# Patient Record
Sex: MALE | Race: WHITE | URBAN - METROPOLITAN AREA
[De-identification: names, ages, dates, MRNs, and addresses within clinical notes are randomized per-mention and may not be internally consistent; named-entity substitution may affect disease eponyms.]

---

## 2017-06-28 ENCOUNTER — TELEPHONE (OUTPATIENT)
Dept: ORTHOPEDIC SURGERY | Age: 57
End: 2017-06-28

## 2017-07-07 ENCOUNTER — TELEPHONE (OUTPATIENT)
Dept: ORTHOPEDIC SURGERY | Age: 57
End: 2017-07-07

## 2017-07-12 ENCOUNTER — OFFICE VISIT (OUTPATIENT)
Dept: ORTHOPEDIC SURGERY | Age: 57
End: 2017-07-12

## 2017-07-12 VITALS — HEIGHT: 70 IN | WEIGHT: 220 LBS | BODY MASS INDEX: 31.5 KG/M2

## 2017-07-12 DIAGNOSIS — S92.415A CLOSED NONDISPLACED FRACTURE OF PROXIMAL PHALANX OF LEFT GREAT TOE, INITIAL ENCOUNTER: ICD-10-CM

## 2017-07-12 DIAGNOSIS — S92.335A CLOSED NONDISPLACED FRACTURE OF THIRD METATARSAL BONE OF LEFT FOOT, INITIAL ENCOUNTER: ICD-10-CM

## 2017-07-12 DIAGNOSIS — S92.512B OPEN DISPLACED FRACTURE OF PROXIMAL PHALANX OF LESSER TOE OF LEFT FOOT, INITIAL ENCOUNTER: ICD-10-CM

## 2017-07-12 DIAGNOSIS — S92.902B: Primary | ICD-10-CM

## 2017-07-12 PROCEDURE — 99024 POSTOP FOLLOW-UP VISIT: CPT | Performed by: NURSE PRACTITIONER

## 2017-07-13 ENCOUNTER — TELEPHONE (OUTPATIENT)
Dept: ORTHOPEDIC SURGERY | Age: 57
End: 2017-07-13

## 2017-07-13 RX ORDER — CEPHALEXIN 500 MG/1
500 CAPSULE ORAL 4 TIMES DAILY
Qty: 40 CAPSULE | Refills: 0 | Status: SHIPPED | OUTPATIENT
Start: 2017-07-13 | End: 2021-08-11

## 2017-07-21 ENCOUNTER — OFFICE VISIT (OUTPATIENT)
Dept: ORTHOPEDIC SURGERY | Age: 57
End: 2017-07-21

## 2017-07-21 VITALS — HEIGHT: 70 IN | HEART RATE: 72 BPM | BODY MASS INDEX: 31.5 KG/M2 | WEIGHT: 220 LBS | RESPIRATION RATE: 16 BRPM

## 2017-07-21 DIAGNOSIS — S92.512B OPEN DISPLACED FRACTURE OF PROXIMAL PHALANX OF LESSER TOE OF LEFT FOOT, INITIAL ENCOUNTER: Primary | ICD-10-CM

## 2017-07-21 DIAGNOSIS — S92.902B: ICD-10-CM

## 2017-07-21 DIAGNOSIS — S92.335A CLOSED NONDISPLACED FRACTURE OF THIRD METATARSAL BONE OF LEFT FOOT, INITIAL ENCOUNTER: ICD-10-CM

## 2017-07-21 DIAGNOSIS — S92.415A CLOSED NONDISPLACED FRACTURE OF PROXIMAL PHALANX OF LEFT GREAT TOE, INITIAL ENCOUNTER: ICD-10-CM

## 2017-07-21 PROCEDURE — 99024 POSTOP FOLLOW-UP VISIT: CPT | Performed by: NURSE PRACTITIONER

## 2017-08-04 ENCOUNTER — OFFICE VISIT (OUTPATIENT)
Dept: ORTHOPEDIC SURGERY | Age: 57
End: 2017-08-04

## 2017-08-04 VITALS — HEIGHT: 70 IN | BODY MASS INDEX: 31.5 KG/M2 | WEIGHT: 220 LBS | RESPIRATION RATE: 16 BRPM

## 2017-08-04 DIAGNOSIS — S98.132A: ICD-10-CM

## 2017-08-04 DIAGNOSIS — S92.335A CLOSED NONDISPLACED FRACTURE OF THIRD METATARSAL BONE OF LEFT FOOT, INITIAL ENCOUNTER: ICD-10-CM

## 2017-08-04 DIAGNOSIS — S92.902B: Primary | ICD-10-CM

## 2017-08-04 DIAGNOSIS — S92.512B OPEN DISPLACED FRACTURE OF PROXIMAL PHALANX OF LESSER TOE OF LEFT FOOT, INITIAL ENCOUNTER: ICD-10-CM

## 2017-08-04 DIAGNOSIS — S92.415A CLOSED NONDISPLACED FRACTURE OF PROXIMAL PHALANX OF LEFT GREAT TOE, INITIAL ENCOUNTER: ICD-10-CM

## 2017-08-04 PROCEDURE — 99024 POSTOP FOLLOW-UP VISIT: CPT | Performed by: ORTHOPAEDIC SURGERY

## 2017-08-10 ENCOUNTER — TELEPHONE (OUTPATIENT)
Dept: ORTHOPEDIC SURGERY | Age: 57
End: 2017-08-10

## 2017-08-25 ENCOUNTER — OFFICE VISIT (OUTPATIENT)
Dept: ORTHOPEDIC SURGERY | Age: 57
End: 2017-08-25

## 2017-08-25 VITALS — WEIGHT: 220 LBS | RESPIRATION RATE: 16 BRPM | BODY MASS INDEX: 31.5 KG/M2 | HEIGHT: 70 IN | HEART RATE: 84 BPM

## 2017-08-25 DIAGNOSIS — S92.512B OPEN DISPLACED FRACTURE OF PROXIMAL PHALANX OF LESSER TOE OF LEFT FOOT, INITIAL ENCOUNTER: ICD-10-CM

## 2017-08-25 DIAGNOSIS — S92.902B: ICD-10-CM

## 2017-08-25 DIAGNOSIS — S98.132A: ICD-10-CM

## 2017-08-25 DIAGNOSIS — S92.415A CLOSED NONDISPLACED FRACTURE OF PROXIMAL PHALANX OF LEFT GREAT TOE, INITIAL ENCOUNTER: ICD-10-CM

## 2017-08-25 DIAGNOSIS — S92.335A CLOSED NONDISPLACED FRACTURE OF THIRD METATARSAL BONE OF LEFT FOOT, INITIAL ENCOUNTER: ICD-10-CM

## 2017-08-25 PROCEDURE — 99024 POSTOP FOLLOW-UP VISIT: CPT | Performed by: ORTHOPAEDIC SURGERY

## 2017-08-28 ENCOUNTER — TELEPHONE (OUTPATIENT)
Dept: ORTHOPEDIC SURGERY | Age: 57
End: 2017-08-28

## 2017-09-22 ENCOUNTER — OFFICE VISIT (OUTPATIENT)
Dept: ORTHOPEDIC SURGERY | Age: 57
End: 2017-09-22

## 2017-09-22 VITALS — RESPIRATION RATE: 16 BRPM | BODY MASS INDEX: 31.5 KG/M2 | WEIGHT: 220 LBS | HEIGHT: 70 IN

## 2017-09-22 DIAGNOSIS — S92.512B OPEN DISPLACED FRACTURE OF PROXIMAL PHALANX OF LESSER TOE OF LEFT FOOT, INITIAL ENCOUNTER: Primary | ICD-10-CM

## 2017-09-22 PROCEDURE — 99213 OFFICE O/P EST LOW 20 MIN: CPT | Performed by: ORTHOPAEDIC SURGERY

## 2017-11-22 ENCOUNTER — OFFICE VISIT (OUTPATIENT)
Dept: ORTHOPEDIC SURGERY | Age: 57
End: 2017-11-22

## 2017-11-22 VITALS
WEIGHT: 231 LBS | BODY MASS INDEX: 33.07 KG/M2 | HEART RATE: 76 BPM | RESPIRATION RATE: 16 BRPM | SYSTOLIC BLOOD PRESSURE: 154 MMHG | DIASTOLIC BLOOD PRESSURE: 73 MMHG | HEIGHT: 70 IN

## 2017-11-22 DIAGNOSIS — S92.512B OPEN DISPLACED FRACTURE OF PROXIMAL PHALANX OF LESSER TOE OF LEFT FOOT, INITIAL ENCOUNTER: ICD-10-CM

## 2017-11-22 DIAGNOSIS — M17.12 PRIMARY OSTEOARTHRITIS OF LEFT KNEE: ICD-10-CM

## 2017-11-22 DIAGNOSIS — S92.335A CLOSED NONDISPLACED FRACTURE OF THIRD METATARSAL BONE OF LEFT FOOT, INITIAL ENCOUNTER: ICD-10-CM

## 2017-11-22 DIAGNOSIS — S92.902B OPEN FRACTURE OF LEFT FOOT, INITIAL ENCOUNTER: ICD-10-CM

## 2017-11-22 DIAGNOSIS — S92.415A CLOSED NONDISPLACED FRACTURE OF PROXIMAL PHALANX OF LEFT GREAT TOE, INITIAL ENCOUNTER: ICD-10-CM

## 2017-11-22 DIAGNOSIS — S98.132A: ICD-10-CM

## 2017-11-22 DIAGNOSIS — M25.562 ACUTE PAIN OF LEFT KNEE: Primary | ICD-10-CM

## 2017-11-22 PROCEDURE — 99213 OFFICE O/P EST LOW 20 MIN: CPT | Performed by: ORTHOPAEDIC SURGERY

## 2017-11-22 NOTE — LETTER
[facility]  [address] [facility] [place]  [address] [facility] [address]  Phone: [phone]  Fax: [phone]    Mateo Roque MD        December 7, 2017     MD Roland Kang [address]  [address] [place] [address]    Patient: Joshua Alvarado  MR Number: [id number]  YOB: 1960  Date of Visit: 11/22/2017    Dear Dr. Yolanda Cary:    Thank you for the request for consultation for Sharene Gosselin to me for  evaluation. Below are the relevant portions of my assessment and plan of care. DIAGNOSIS:   1-Left foot severe traumatic open fracture lawnmower injury  2-Left foot third toe open fracture proximal and middle phalanx  3-Left foot 2nd and 3rd MT base fracture  4-Left foot great toe proximal and distal phalanx closed fracture  5-Left foot 2nd toe distal phalanx open fracture, s/p I&D  6-Left knee pain/osteoarthritis-new    DATE OF SURGERY:  6/28/2017. HISTORY OF PRESENT ILLNESS:  Mr. Benji Tafoya 62 y.o.  male who came in today for postoperative visit. The patient denies any significant pain in the left foot. He has been WB and doing well. Rates pain a 0-1/10 VAS tender and throbbing, intermittent, but improving. Pain is worse with walking and better with rest. His wound is much improved. No numbness or tingling sensation. No fever or Chills. He has completed antibiotics. He has new c/o left knee pain that was worse after the injury, somewhat better since, but still has pain. Rates pain a 3/10 VAS mild achy, worse with walking and better with rest. He has not had any treatment for this problem. No past medical history on file.     Past Surgical History:   Procedure Laterality Date    KNEE SURGERY      SINUS SURGERY      SINUS SURGERY         Social History     Social History    Marital status:      Spouse name: N/A    Number of children: N/A    Years of education: N/A     Occupational History  Not on file. Social History Main Topics    Smoking status: Never Smoker    Smokeless tobacco: Never Used    Alcohol use No    Drug use: No    Sexual activity: Not on file     Other Topics Concern    Not on file     Social History Narrative    No narrative on file       No family history on file. Current Outpatient Prescriptions on File Prior to Visit   Medication Sig Dispense Refill    cephALEXin (KEFLEX) 500 MG capsule Take 1 capsule by mouth 4 times daily 40 capsule 0    cephALEXin (KEFLEX) 500 MG capsule Take 1 capsule by mouth 4 times daily 40 capsule 0     No current facility-administered medications on file prior to visit. Pertinent items are noted in HPI  Review of systems reviewed from Patient History Form dated on 7/12/2017 and available in the patient's chart under the Media tab. No change noted    PHYSICAL EXAMINATION:  Mr. Johana Sparrow is a very pleasant 62 y.o.  male who presents today in no acute distress, awake, alert, and oriented. He is well dressed, nourished and  groomed. Patient with normal affect. Height is  5' 10\" (1.778 m), weight is 231 lb (104.8 kg), Body mass index is 33.15 kg/m². Resting respiratory rate is 16. He walks WB with no limp. There is a small scab under the 3rd toe that is decreasing in size. No signs of any erythema or drainage, minimal swelling. He has no pain with the active or passive range of motion of the left toes, decreased ROM. He has intact sensation distally, and he is neurovascularly intact. Good strength, and no instability both upper and lower extremities. Ankle reflex 1+ bilaterally. Examination of both knees showing full ROM, left mild crepitus, tenderness on medial joint line, stable to varus and valgus stress. He has intact sensation and good pedal pulses. He has good strength in 2 planes, and has mild tenderness on deep palpation over the medial joint line. Knee reflex 1+ bilaterally. RADIOLOGY: Xrays 3 views of the left foot taken today in the office were reviewed and showed the 1st proximal and distal phalanges, 2nd distal phalanx and proximal phalanx, 3rd the proximal,middle and distal phalanges, 4th proximal phalanx and 5th proximal phalanx and 2nd and 3rd MT base fractures. Xray 3 views of the left knee was obtained today in the office and reviewed. These demonstrate mild degenerative changes with narrowing of the joint space in the medial joint space compartment, subchondral sclerosis, and marginal osteophytosis. IMPRESSION:    1-Left foot severe traumatic open fracture lawnmower injury  2-Left foot third toe open fracture proximal and middle phalanx  3-Left foot 2nd and 3rd MT base fracture  4-Left foot great toe proximal and distal phalanx closed fracture  5-Left foot 2nd toe distal phalanx open fracture, s/p I&D  6-Left knee DJD-new    PLAN: I discussed the findings and plan with the patient. He can continue WBAT and return to normal activities. Follow up PRN. For the knee: I discussed with the patient the treatment options including both surgical and non-surgical treatment. We recommended Quad exercises and stretching of the calf and hamstrings which was taught to the patient today. He will take NSAIDS Naprosyn PRN. Follow up 6 weeks PRN. If pain worsens or does not improve, we may consider PT or Cortisone injection. Mateo Roque MD                   DIAGNOSIS:   1-Left foot severe traumatic open fracture lawnmower injury  2-Left foot third toe open fracture proximal and middle phalanx  3-Left foot 2nd and 3rd MT base fracture  4-Left foot great toe proximal and distal phalanx closed fracture  5-Left foot 2nd toe distal phalanx open fracture, s/p I&D  6-Left knee pain/osteoarthritis-new    DATE OF SURGERY:  6/28/2017. HISTORY OF PRESENT ILLNESS:  Mr. Benji Tafoya 62 y.o.   male who came in today for postoperative visit. The patient denies any significant pain in the left foot. He has been WB and doing well. Rates pain a 0-1/10 VAS tender and throbbing, intermittent, but improving. Pain is worse with walking and better with rest. His wound is much improved. No numbness or tingling sensation. No fever or Chills. He has completed antibiotics. He has new c/o left knee pain that was worse after the injury, somewhat better since, but still has pain. Rates pain a 3/10 VAS mild achy, worse with walking and better with rest. He has not had any treatment for this problem. No past medical history on file. Past Surgical History:   Procedure Laterality Date    KNEE SURGERY      SINUS SURGERY      SINUS SURGERY         Social History     Social History    Marital status:      Spouse name: N/A    Number of children: N/A    Years of education: N/A     Occupational History    Not on file. Social History Main Topics    Smoking status: Never Smoker    Smokeless tobacco: Never Used    Alcohol use No    Drug use: No    Sexual activity: Not on file     Other Topics Concern    Not on file     Social History Narrative    No narrative on file       No family history on file. Current Outpatient Prescriptions on File Prior to Visit   Medication Sig Dispense Refill    cephALEXin (KEFLEX) 500 MG capsule Take 1 capsule by mouth 4 times daily 40 capsule 0    cephALEXin (KEFLEX) 500 MG capsule Take 1 capsule by mouth 4 times daily 40 capsule 0     No current facility-administered medications on file prior to visit. Pertinent items are noted in HPI  Review of systems reviewed from Patient History Form dated on 7/12/2017 and available in the patient's chart under the Media tab. No change noted    PHYSICAL EXAMINATION:  Mr. Hector Bowens is a very pleasant 62 y.o.  male who presents today in no acute distress, awake, alert, and oriented.   He is well dressed, nourished and  groomed. Patient with normal affect. Height is  5' 10\" (1.778 m), weight is 231 lb (104.8 kg), Body mass index is 33.15 kg/m². Resting respiratory rate is 16. He walks WB with no limp. There is a small scab under the 3rd toe that is decreasing in size. No signs of any erythema or drainage, minimal swelling. He has no pain with the active or passive range of motion of the left toes, decreased ROM. He has intact sensation distally, and he is neurovascularly intact. Good strength, and no instability both upper and lower extremities. Ankle reflex 1+ bilaterally. Examination of both knees showing full ROM, left mild crepitus, tenderness on medial joint line, stable to varus and valgus stress. He has intact sensation and good pedal pulses. He has good strength in 2 planes, and has mild tenderness on deep palpation over the medial joint line. Knee reflex 1+ bilaterally. RADIOLOGY: Xrays 3 views of the left foot taken today in the office were reviewed and showed the 1st proximal and distal phalanges, 2nd distal phalanx and proximal phalanx, 3rd the proximal,middle and distal phalanges, 4th proximal phalanx and 5th proximal phalanx and 2nd and 3rd MT base fractures. Xray 3 views of the left knee was obtained today in the office and reviewed. These demonstrate mild degenerative changes with narrowing of the joint space in the medial joint space compartment, subchondral sclerosis, and marginal osteophytosis. IMPRESSION:    1-Left foot severe traumatic open fracture lawnmower injury  2-Left foot third toe open fracture proximal and middle phalanx  3-Left foot 2nd and 3rd MT base fracture  4-Left foot great toe proximal and distal phalanx closed fracture  5-Left foot 2nd toe distal phalanx open fracture, s/p I&D  6-Left knee DJD-new    PLAN: I discussed the findings and plan with the patient. He can continue WBAT and return to normal activities. Follow up PRN.

## 2017-11-22 NOTE — PROGRESS NOTES
DIAGNOSIS:   1-Left foot severe traumatic open fracture lawnmower injury  2-Left foot third toe open fracture proximal and middle phalanx  3-Left foot 2nd and 3rd MT base fracture  4-Left foot great toe proximal and distal phalanx closed fracture  5-Left foot 2nd toe distal phalanx open fracture, s/p I&D  6-Left knee pain/osteoarthritis-new    DATE OF SURGERY:  6/28/2017. HISTORY OF PRESENT ILLNESS:  Mr. Daniela Sadler 62 y.o.  male who came in today for postoperative visit. The patient denies any significant pain in the left foot. He has been WB and doing well. Rates pain a 0-1/10 VAS tender and throbbing, intermittent, but improving. Pain is worse with walking and better with rest. His wound is much improved. No numbness or tingling sensation. No fever or Chills. He has completed antibiotics. He has new c/o left knee pain that was worse after the injury, somewhat better since, but still has pain. Rates pain a 3/10 VAS mild achy, worse with walking and better with rest. He has not had any treatment for this problem. No past medical history on file. Past Surgical History:   Procedure Laterality Date    KNEE SURGERY      SINUS SURGERY      SINUS SURGERY         Social History     Social History    Marital status:      Spouse name: N/A    Number of children: N/A    Years of education: N/A     Occupational History    Not on file. Social History Main Topics    Smoking status: Never Smoker    Smokeless tobacco: Never Used    Alcohol use No    Drug use: No    Sexual activity: Not on file     Other Topics Concern    Not on file     Social History Narrative    No narrative on file       No family history on file.     Current Outpatient Prescriptions on File Prior to Visit   Medication Sig Dispense Refill    cephALEXin (KEFLEX) 500 MG capsule Take 1 capsule by mouth 4 times daily 40 capsule 0    cephALEXin (KEFLEX) 500 MG capsule Take 1 capsule by mouth 4 times daily 40 capsule 0     No fracture lawnmower injury  2-Left foot third toe open fracture proximal and middle phalanx  3-Left foot 2nd and 3rd MT base fracture  4-Left foot great toe proximal and distal phalanx closed fracture  5-Left foot 2nd toe distal phalanx open fracture, s/p I&D  6-Left knee DJD-new    PLAN: I discussed the findings and plan with the patient. He can continue WBAT and return to normal activities. Follow up PRN. For the knee: I discussed with the patient the treatment options including both surgical and non-surgical treatment. We recommended Quad exercises and stretching of the calf and hamstrings which was taught to the patient today. He will take NSAIDS Naprosyn PRN. Follow up 6 weeks PRN. If pain worsens or does not improve, we may consider PT or Cortisone injection.        Slade Story MD

## 2017-12-07 NOTE — COMMUNICATION BODY
current facility-administered medications on file prior to visit. Pertinent items are noted in HPI  Review of systems reviewed from Patient History Form dated on 7/12/2017 and available in the patient's chart under the Media tab. No change noted    PHYSICAL EXAMINATION:  Mr. Minesh Borrego is a very pleasant 62 y.o.  male who presents today in no acute distress, awake, alert, and oriented. He is well dressed, nourished and  groomed. Patient with normal affect. Height is  5' 10\" (1.778 m), weight is 231 lb (104.8 kg), Body mass index is 33.15 kg/m². Resting respiratory rate is 16. He walks WB with no limp. There is a small scab under the 3rd toe that is decreasing in size. No signs of any erythema or drainage, minimal swelling. He has no pain with the active or passive range of motion of the left toes, decreased ROM. He has intact sensation distally, and he is neurovascularly intact. Good strength, and no instability both upper and lower extremities. Ankle reflex 1+ bilaterally. Examination of both knees showing full ROM, left mild crepitus, tenderness on medial joint line, stable to varus and valgus stress. He has intact sensation and good pedal pulses. He has good strength in 2 planes, and has mild tenderness on deep palpation over the medial joint line. Knee reflex 1+ bilaterally. RADIOLOGY: Xrays 3 views of the left foot taken today in the office were reviewed and showed the 1st proximal and distal phalanges, 2nd distal phalanx and proximal phalanx, 3rd the proximal,middle and distal phalanges, 4th proximal phalanx and 5th proximal phalanx and 2nd and 3rd MT base fractures. Xray 3 views of the left knee was obtained today in the office and reviewed. These demonstrate mild degenerative changes with narrowing of the joint space in the medial joint space compartment, subchondral sclerosis, and marginal osteophytosis.          IMPRESSION:    1-Left foot severe traumatic open Date    KNEE SURGERY      SINUS SURGERY      SINUS SURGERY         Social History     Social History    Marital status:      Spouse name: N/A    Number of children: N/A    Years of education: N/A     Occupational History    Not on file. Social History Main Topics    Smoking status: Never Smoker    Smokeless tobacco: Never Used    Alcohol use No    Drug use: No    Sexual activity: Not on file     Other Topics Concern    Not on file     Social History Narrative    No narrative on file       No family history on file. Current Outpatient Prescriptions on File Prior to Visit   Medication Sig Dispense Refill    cephALEXin (KEFLEX) 500 MG capsule Take 1 capsule by mouth 4 times daily 40 capsule 0    cephALEXin (KEFLEX) 500 MG capsule Take 1 capsule by mouth 4 times daily 40 capsule 0     No current facility-administered medications on file prior to visit. Pertinent items are noted in HPI  Review of systems reviewed from Patient History Form dated on 7/12/2017 and available in the patient's chart under the Media tab. No change noted    PHYSICAL EXAMINATION:  Mr. Odalis Maurer is a very pleasant 62 y.o.  male who presents today in no acute distress, awake, alert, and oriented. He is well dressed, nourished and  groomed. Patient with normal affect. Height is  5' 10\" (1.778 m), weight is 231 lb (104.8 kg), Body mass index is 33.15 kg/m². Resting respiratory rate is 16. He walks WB with no limp. There is a small scab under the 3rd toe that is decreasing in size. No signs of any erythema or drainage, minimal swelling. He has no pain with the active or passive range of motion of the left toes, decreased ROM. He has intact sensation distally, and he is neurovascularly intact. Good strength, and no instability both upper and lower extremities. Ankle reflex 1+ bilaterally.   Examination of both knees showing full ROM, left mild crepitus, tenderness on medial joint line, stable to varus and valgus stress. He has intact sensation and good pedal pulses. He has good strength in 2 planes, and has mild tenderness on deep palpation over the medial joint line. Knee reflex 1+ bilaterally. RADIOLOGY: Xrays 3 views of the left foot taken today in the office were reviewed and showed the 1st proximal and distal phalanges, 2nd distal phalanx and proximal phalanx, 3rd the proximal,middle and distal phalanges, 4th proximal phalanx and 5th proximal phalanx and 2nd and 3rd MT base fractures. Xray 3 views of the left knee was obtained today in the office and reviewed. These demonstrate mild degenerative changes with narrowing of the joint space in the medial joint space compartment, subchondral sclerosis, and marginal osteophytosis. IMPRESSION:    1-Left foot severe traumatic open fracture lawnmower injury  2-Left foot third toe open fracture proximal and middle phalanx  3-Left foot 2nd and 3rd MT base fracture  4-Left foot great toe proximal and distal phalanx closed fracture  5-Left foot 2nd toe distal phalanx open fracture, s/p I&D  6-Left knee DJD-new    PLAN: I discussed the findings and plan with the patient. He can continue WBAT and return to normal activities. Follow up PRN. For the knee: I discussed with the patient the treatment options including both surgical and non-surgical treatment. We recommended Quad exercises and stretching of the calf and hamstrings which was taught to the patient today. He will take NSAIDS Naprosyn PRN. Follow up 6 weeks PRN. If pain worsens or does not improve, we may consider PT or Cortisone injection.        Gi Ramos MD

## 2021-08-11 ENCOUNTER — APPOINTMENT (OUTPATIENT)
Dept: GENERAL RADIOLOGY | Age: 61
End: 2021-08-11
Payer: COMMERCIAL

## 2021-08-11 ENCOUNTER — HOSPITAL ENCOUNTER (EMERGENCY)
Age: 61
Discharge: HOME | End: 2021-08-11
Attending: EMERGENCY MEDICINE
Payer: COMMERCIAL

## 2021-08-11 VITALS
OXYGEN SATURATION: 97 % | SYSTOLIC BLOOD PRESSURE: 189 MMHG | RESPIRATION RATE: 19 BRPM | BODY MASS INDEX: 31.15 KG/M2 | DIASTOLIC BLOOD PRESSURE: 73 MMHG | HEIGHT: 70 IN | WEIGHT: 217.59 LBS | TEMPERATURE: 98.7 F | HEART RATE: 70 BPM

## 2021-08-11 DIAGNOSIS — R00.2 PALPITATIONS: Primary | ICD-10-CM

## 2021-08-11 DIAGNOSIS — R03.0 ELEVATED BLOOD PRESSURE READING: ICD-10-CM

## 2021-08-11 DIAGNOSIS — R73.9 HYPERGLYCEMIA: ICD-10-CM

## 2021-08-11 LAB
ANION GAP SERPL CALCULATED.3IONS-SCNC: 12 MMOL/L (ref 3–16)
BASOPHILS ABSOLUTE: 0.1 K/UL (ref 0–0.2)
BASOPHILS RELATIVE PERCENT: 0.6 %
BUN BLDV-MCNC: 24 MG/DL (ref 7–20)
CALCIUM SERPL-MCNC: 9.5 MG/DL (ref 8.3–10.6)
CHLORIDE BLD-SCNC: 98 MMOL/L (ref 99–110)
CO2: 27 MMOL/L (ref 21–32)
CREAT SERPL-MCNC: 1.2 MG/DL (ref 0.8–1.3)
EOSINOPHILS ABSOLUTE: 0.4 K/UL (ref 0–0.6)
EOSINOPHILS RELATIVE PERCENT: 3.8 %
GFR AFRICAN AMERICAN: >60
GFR NON-AFRICAN AMERICAN: >60
GLUCOSE BLD-MCNC: 363 MG/DL (ref 70–99)
GLUCOSE BLD-MCNC: 408 MG/DL (ref 70–99)
HCT VFR BLD CALC: 44.2 % (ref 40.5–52.5)
HEMOGLOBIN: 14.7 G/DL (ref 13.5–17.5)
LYMPHOCYTES ABSOLUTE: 2.7 K/UL (ref 1–5.1)
LYMPHOCYTES RELATIVE PERCENT: 26.1 %
MCH RBC QN AUTO: 27.8 PG (ref 26–34)
MCHC RBC AUTO-ENTMCNC: 33.3 G/DL (ref 31–36)
MCV RBC AUTO: 83.5 FL (ref 80–100)
MONOCYTES ABSOLUTE: 0.8 K/UL (ref 0–1.3)
MONOCYTES RELATIVE PERCENT: 7.5 %
NEUTROPHILS ABSOLUTE: 6.2 K/UL (ref 1.7–7.7)
NEUTROPHILS RELATIVE PERCENT: 62 %
PDW BLD-RTO: 13.9 % (ref 12.4–15.4)
PERFORMED ON: ABNORMAL
PLATELET # BLD: 223 K/UL (ref 135–450)
PMV BLD AUTO: 8.1 FL (ref 5–10.5)
POTASSIUM SERPL-SCNC: 4 MMOL/L (ref 3.5–5.1)
RBC # BLD: 5.29 M/UL (ref 4.2–5.9)
SODIUM BLD-SCNC: 137 MMOL/L (ref 136–145)
TROPONIN: <0.01 NG/ML
WBC # BLD: 10.2 K/UL (ref 4–11)

## 2021-08-11 PROCEDURE — 2580000003 HC RX 258: Performed by: EMERGENCY MEDICINE

## 2021-08-11 PROCEDURE — 84484 ASSAY OF TROPONIN QUANT: CPT

## 2021-08-11 PROCEDURE — 71045 X-RAY EXAM CHEST 1 VIEW: CPT

## 2021-08-11 PROCEDURE — 85025 COMPLETE CBC W/AUTO DIFF WBC: CPT

## 2021-08-11 PROCEDURE — 99284 EMERGENCY DEPT VISIT MOD MDM: CPT

## 2021-08-11 PROCEDURE — 36415 COLL VENOUS BLD VENIPUNCTURE: CPT

## 2021-08-11 PROCEDURE — 83036 HEMOGLOBIN GLYCOSYLATED A1C: CPT

## 2021-08-11 PROCEDURE — 80048 BASIC METABOLIC PNL TOTAL CA: CPT

## 2021-08-11 PROCEDURE — 82947 ASSAY GLUCOSE BLOOD QUANT: CPT

## 2021-08-11 PROCEDURE — 93005 ELECTROCARDIOGRAM TRACING: CPT | Performed by: EMERGENCY MEDICINE

## 2021-08-11 RX ORDER — 0.9 % SODIUM CHLORIDE 0.9 %
1000 INTRAVENOUS SOLUTION INTRAVENOUS ONCE
Status: COMPLETED | OUTPATIENT
Start: 2021-08-11 | End: 2021-08-11

## 2021-08-11 RX ADMIN — SODIUM CHLORIDE 999 ML: 9 INJECTION, SOLUTION INTRAVENOUS at 22:25

## 2021-08-12 LAB
EKG ATRIAL RATE: 91 BPM
EKG DIAGNOSIS: NORMAL
EKG P AXIS: 60 DEGREES
EKG P-R INTERVAL: 142 MS
EKG Q-T INTERVAL: 356 MS
EKG QRS DURATION: 104 MS
EKG QTC CALCULATION (BAZETT): 437 MS
EKG R AXIS: -9 DEGREES
EKG T AXIS: 68 DEGREES
EKG VENTRICULAR RATE: 91 BPM
ESTIMATED AVERAGE GLUCOSE: 211.6 MG/DL
HBA1C MFR BLD: 9 %

## 2021-08-12 PROCEDURE — 93010 ELECTROCARDIOGRAM REPORT: CPT | Performed by: INTERNAL MEDICINE

## 2021-08-12 NOTE — ED NOTES
Patient reports he is always thirsty. It's been like that for years.       Wyatt Shaw RN  08/11/21 [id number]

## 2021-08-12 NOTE — ED NOTES
Gave patient discharge instructions. He states, understanding. Patient discharged to home with.       Anisa Beach RN  08/12/21 0001

## 2021-08-12 NOTE — ED PROVIDER NOTES
[facility]  eMERGENCY dEPARTMENT eNCOUnter      Pt Name: Yousuf Parkinson  MRN: [id number]  [place] 1960  Date of evaluation: 8/11/2021  Provider: Susy Kimble MD    [address] [place] [address]       Chief Complaint   Patient presents with    Irregular Heart Beat     patient states, he was laying in bed it felt like his heart was beating fast off and on. He felt a little sob         CRITICAL CARE TIME   Total Critical Care time was 0 minutes, excluding separately reportable procedures. There was a high probability of clinically significant/life threatening deterioration in the patient's condition which required my urgent intervention. HISTORY OF PRESENT ILLNESS  (Location/Symptom, Timing/Onset, Context/Setting, Quality, Duration, Modifying Factors, Severity.)   Yousuf Parkinson is a 64 y.o. male who presents to the emergency department complaining of feeling like his heart was skipping beats or stopping. It started about an hour prior to arrival while he was lying in bed. He states it did not really feel like it was racing. He did not have any pain. He states he might have been a little short of breath. No dizziness. No leg pain or leg swelling. No cardiac history. Nursing Notes were reviewed and I agree. REVIEW OF SYSTEMS    (2-9 systems for level 4, 10 or more for level 5)     General: No fever or chills. ENT: No nasal congestion sore throat or earache. Cardiovascular: Felt like his heart was skipping or stopping. No racing heart. No chest pain. Pulmonary: States he felt a little short of breath. GI: No abdominal pain nausea vomiting. Constantly thirsty for \"years\". Neuro: No dizziness or passing out. Musculoskeletal: No leg pain or leg swelling. : Frequent urination for \"years\". No dysuria. Except as noted above the remainder of the review of systems was reviewed and negative. PAST MEDICAL HISTORY   History reviewed.  No pertinent past medical history. SURGICAL HISTORY       Past Surgical History:   Procedure Laterality Date    KNEE SURGERY      SINUS SURGERY      SINUS SURGERY           CURRENT MEDICATIONS       Previous Medications    No medications on file       ALLERGIES     Patient has no known allergies. FAMILY HISTORY     History reviewed. No pertinent family history. SOCIAL HISTORY       Social History     Socioeconomic History    Marital status:      Spouse name: None    Number of children: None    Years of education: None    Highest education level: None   Occupational History    None   Tobacco Use    Smoking status: Never Smoker    Smokeless tobacco: Never Used   Substance and Sexual Activity    Alcohol use: No    Drug use: No    Sexual activity: None   Other Topics Concern    None   Social History Narrative    None     Social Determinants of Health     Financial Resource Strain:     Difficulty of Paying Living Expenses:    Food Insecurity:     Worried About Running Out of Food in the Last Year:     Ran Out of Food in the Last Year:    Transportation Needs:     Lack of Transportation (Medical):      Lack of Transportation (Non-Medical):    Physical Activity:     Days of Exercise per Week:     Minutes of Exercise per Session:    Stress:     Feeling of Stress :    Social Connections:     Frequency of Communication with Friends and Family:     Frequency of Social Gatherings with Friends and Family:     Attends Yazidi Services:     Active Member of Clubs or Organizations:     Attends Club or Organization Meetings:     Marital Status:    Intimate Partner Violence:     Fear of Current or Ex-Partner:     Emotionally Abused:     Physically Abused:     Sexually Abused:          PHYSICAL EXAM    (up to 7 for level 4, 8 or more for level 5)     ED Triage Vitals [08/11/21 2142]   BP Temp Temp Source Pulse Resp SpO2 Height Weight   (!) 224/82 98.7 °F (37.1 °C) Oral 92 18 98 % 5' 10\" (1.778 m) 217 lb 9.5 oz (98.7 kg)       General: Alert moderately obese white male no acute distress. Head: Atraumatic and normocephalic. Eyes: No conjunctival injection. Pupils equal round reactive. No pallor. ENT: Chayito Mp is clear. Oropharynx is moist without erythema. Neck: Supple without adenopathy, nontender. Heart: Regular rate and rhythm. No murmurs or gallops noted. Lungs: Breath sounds equal bilaterally and clear. Abdomen: Obese, soft, nontender. No mass organomegaly. Bowel sounds are normal.  Musculoskeletal: No lower extremity edema. No calf tenderness. Intact symmetrical distal pulses. Skin: Warm and dry, good turgor. No pallor or cyanosis. No diaphoresis. Neuro: Awake, alert, oriented. Symmetrical reactive pupils. Intact extraocular movements. No facial asymmetry. Symmetrical motor function. Normal gait without ataxia. DIFFERENTIAL DIAGNOSIS   Differential includes but is not limited to PACs, PVCs, atrial fibrillation/atrial flutter, PSVT, bradycardia arrhythmia, electrolyte abnormality. DIAGNOSTIC RESULTS     EKG: All EKG's are interpreted by Julia Dempsey MD in the absence of a cardiologist.    Normal sinus rhythm, rate of 91, incomplete right bundle branch block, nonspecific ST-T wave changes. Rhythm strip shows sinus rhythm with a rate of 91, LA interval 142 ms,  ms with no other ectopy as interpreted by me. No change compared to EKG dated 6/26/2017. RADIOLOGY:   Non-plain film images such as CT, Ultrasound and MRI are read by the radiologist. Plain radiographic images are visualized and preliminarily interpreted Julia Dempsey MD with the below findings:      Interpretation per the Radiologist below, if available at the time of this note:    XR CHEST PORTABLE   Final Result   No acute cardiopulmonary abnormality.                ED BEDSIDE ULTRASOUND:   Performed by ED Physician - none    LABS:  Labs Reviewed   BASIC METABOLIC PANEL - Abnormal; Notable for the following components:       Result Value    Chloride 98 (*)     Glucose 363 (*)     BUN 24 (*)     All other components within normal limits    Narrative:     Performed at:  [facility]) [place] [address] [facility]   Phone [phone]   POCT GLUCOSE - Abnormal; Notable for the following components:    POC Glucose 408 (*)     All other components within normal limits    Narrative:     Performed at:  [facility]) [place] [address] [facility]   Phone [phone]   CBC WITH AUTO DIFFERENTIAL    Narrative:     Performed at:  [address] [place] [address]   Phone [phone]   TROPONIN    Narrative:     Performed at:  [address] [place] [address]   Phone [phone]   HEMOGLOBIN A1C       All other labs were within normal range or not returned as of this dictation. EMERGENCY DEPARTMENT COURSE and DIFFERENTIAL DIAGNOSIS/MDM:   Vitals:    Vitals:    08/11/21 2221 08/11/21 2225 08/11/21 2230 08/11/21 2300   BP:  (!) 187/74 (!) 194/70 (!) 176/69   Pulse: 76 72 77 72   Resp: 20 23 22 19   Temp:       TempSrc:       SpO2: 94% 95% 95% 96%   Weight:       Height:           Patient presents sensing some palpitations or skipped beats or pauses when he was laying in bed. No chest pain. No dizziness. He did not feel like his heart was racing. He coincidentally notes that he has been thirsty for years and has been drinking lots of water and has been urinating frequently for years. He is in a normal sinus rhythm here. He has had some PACs on the monitor, no PVCs or any other arrhythmia or ectopy. His blood sugar was elevated. His blood pressures been elevated. Is trending down but his systolic blood pressure is still up. He was given a liter of IV fluids.   A hemoglobin A1c was sent.  I think he can be worked up and managed as an outpatient. I started him on Metformin. He understands he needs to follow-up with his primary care physician for all 3 problems, his palpitations which she understands may need further testing including but not limited to a Holter monitor or event monitor. He will need to follow-up for his elevated blood pressure readings here, he understands that it will likely need further treatment and I recommended that he check his blood pressures twice daily and record these readings prior to his doctor's appointment in the next few days. He understands that his blood sugar is elevated and based on his symptoms is probably been elevated for years, he was started on Metformin and he will follow up with his physician for follow-up on his hemoglobin A1c that was sent today and for dietary counseling, weight loss counseling, and adjustments in medications to control his blood sugar. Test results, diagnosis, and treatment plan were discussed with the patient with his permission his wife and son. They understand the treatment plan and follow-up as discussed. CONSULTS:  None    PROCEDURES:  None    FINAL IMPRESSION      1. Palpitations    2. Elevated blood pressure reading    3.  Hyperglycemia          DISPOSITION/PLAN   DISPOSITION Decision To Discharge 08/11/2021 11:34:07 PM      PATIENT REFERRED TO:  MD Roland Vasquez [address] [place] [address]  [phone]    Schedule an appointment as soon as possible for a visit in 5 days        DISCHARGE MEDICATIONS:  New Prescriptions    METFORMIN (GLUCOPHAGE) 500 MG TABLET    Take 1 tablet by mouth 2 times daily (with meals)       (Please note that portions of this note were completed with a voice recognition program.  Efforts were made to edit the dictations but occasionally words are mis-transcribed.)    Miguel Sebastian MD  Attending Emergency Physician        Susana Sands,

## 2023-11-25 ENCOUNTER — HOSPITAL ENCOUNTER (EMERGENCY)
Age: 63
Discharge: HOME | End: 2023-11-25
Attending: EMERGENCY MEDICINE
Payer: COMMERCIAL

## 2023-11-25 VITALS
HEART RATE: 66 BPM | TEMPERATURE: 98.4 F | WEIGHT: 210 LBS | RESPIRATION RATE: 17 BRPM | DIASTOLIC BLOOD PRESSURE: 79 MMHG | OXYGEN SATURATION: 97 % | BODY MASS INDEX: 30.06 KG/M2 | SYSTOLIC BLOOD PRESSURE: 181 MMHG | HEIGHT: 70 IN

## 2023-11-25 DIAGNOSIS — S50.861A NONVENOMOUS INSECT BITE OF RIGHT FOREARM WITHOUT INFECTION, INITIAL ENCOUNTER: Primary | ICD-10-CM

## 2023-11-25 DIAGNOSIS — I10 ESSENTIAL HYPERTENSION: ICD-10-CM
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
## 2023-11-25 PROCEDURE — 99282 EMERGENCY DEPT VISIT SF MDM: CPT

## 2023-11-25 RX ORDER — LOSARTAN POTASSIUM 100 MG/1
1 TABLET ORAL
COMMUNITY
Start: 2023-06-30

## 2023-11-25 RX ORDER — ATORVASTATIN CALCIUM 20 MG/1
1 TABLET, FILM COATED ORAL
COMMUNITY

## 2023-11-25 RX ORDER — PREGABALIN 75 MG/1
1 CAPSULE ORAL
COMMUNITY
Start: 2023-09-25

## 2023-11-25 ASSESSMENT — LIFESTYLE VARIABLES
HOW OFTEN DO YOU HAVE A DRINK CONTAINING ALCOHOL: NEVER
HOW MANY STANDARD DRINKS CONTAINING ALCOHOL DO YOU HAVE ON A TYPICAL DAY: PATIENT DOES NOT DRINK

## 2023-11-25 ASSESSMENT — PAIN - FUNCTIONAL ASSESSMENT
PAIN_FUNCTIONAL_ASSESSMENT: NONE - DENIES PAIN
PAIN_FUNCTIONAL_ASSESSMENT: NONE - DENIES PAIN

## 2023-11-25 NOTE — ED PROVIDER NOTES
EMERGENCY DEPARTMENT PROVIDER NOTE    Patient Identification  Pt Name: Francisco Blake  MRN: [id number]  [address] [facility] 1960  Date of evaluation: 11/25/2023  Provider: Margairta Beverly DO  PCP: Winston Flores MD    Chief Complaint  Tick Removal (Pt states he went to urgent care to get a tick removed and was told they would just pull it out with tweezers. Pt tried to pull it out with tweezers but it broke apart. )      HPI  (History provided by patient, spouse)  This is a 61 y.o. male with pertinent past medical history of diabetes, hypertension who was brought in by self for insect bite to his right arm. Bite is adjacent to his right elbow, his spouse remove the insect with tweezers and believes it was a tick, is concerned as the insect came apart in pieces. Patient has been out hunting in the woods for the past 2 to 3 days, believes the tick bite either occurred yesterday or today. The tick did not appear engorged. He has some mild redness around the bite site, otherwise no symptomatic complaints. Tetanus UTD. I have reviewed the following nursing documentation:  Allergies: Patient has no known allergies. Past medical history:   Past Medical History:   Diagnosis Date    Diabetes mellitus ([address])     Hyperlipidemia     Hypertension      Past surgical history:   Past Surgical History:   Procedure Laterality Date    KNEE SURGERY      SINUS SURGERY      SINUS SURGERY         Home medications:   Discharge Medication List as of 11/25/2023  6:30 PM        CONTINUE these medications which have NOT CHANGED    Details   losartan (COZAAR) 100 MG tablet Take 1 tablet by mouth Every DayHistorical Med      pregabalin (LYRICA) 75 MG capsule 1 capsule. Historical Med      atorvastatin (LIPITOR) 20 MG tablet Take 1 tablet by mouth Every DayHistorical Med      metFORMIN (GLUCOPHAGE) 500 MG tablet Take 1 tablet by mouth 2 times daily (with meals), Disp-60 tablet, R-0Normal             Social history:  reports that he has